# Patient Record
Sex: MALE | Race: WHITE | NOT HISPANIC OR LATINO | Employment: FULL TIME | URBAN - NONMETROPOLITAN AREA
[De-identification: names, ages, dates, MRNs, and addresses within clinical notes are randomized per-mention and may not be internally consistent; named-entity substitution may affect disease eponyms.]

---

## 2024-08-28 ENCOUNTER — HOSPITAL ENCOUNTER (EMERGENCY)
Facility: HOSPITAL | Age: 49
Discharge: HOME | End: 2024-08-28
Attending: STUDENT IN AN ORGANIZED HEALTH CARE EDUCATION/TRAINING PROGRAM

## 2024-08-28 ENCOUNTER — APPOINTMENT (OUTPATIENT)
Dept: RADIOLOGY | Facility: HOSPITAL | Age: 49
End: 2024-08-28

## 2024-08-28 ENCOUNTER — HOSPITAL ENCOUNTER (OUTPATIENT)
Dept: CARDIOLOGY | Facility: HOSPITAL | Age: 49
Discharge: HOME | End: 2024-08-28

## 2024-08-28 ENCOUNTER — APPOINTMENT (OUTPATIENT)
Dept: CARDIOLOGY | Facility: HOSPITAL | Age: 49
End: 2024-08-28

## 2024-08-28 VITALS
HEIGHT: 72 IN | WEIGHT: 230 LBS | SYSTOLIC BLOOD PRESSURE: 124 MMHG | HEART RATE: 66 BPM | BODY MASS INDEX: 31.15 KG/M2 | DIASTOLIC BLOOD PRESSURE: 84 MMHG | TEMPERATURE: 97.6 F | RESPIRATION RATE: 14 BRPM | OXYGEN SATURATION: 100 %

## 2024-08-28 DIAGNOSIS — R42 VERTIGO: Primary | ICD-10-CM

## 2024-08-28 DIAGNOSIS — R42 DIZZINESS: ICD-10-CM

## 2024-08-28 LAB
ALBUMIN SERPL BCP-MCNC: 4.5 G/DL (ref 3.4–5)
ALP SERPL-CCNC: 48 U/L (ref 33–120)
ALT SERPL W P-5'-P-CCNC: 42 U/L (ref 10–52)
AMMONIA PLAS-SCNC: 27 UMOL/L (ref 16–53)
ANION GAP SERPL CALC-SCNC: 14 MMOL/L (ref 10–20)
APPEARANCE UR: CLEAR
AST SERPL W P-5'-P-CCNC: 22 U/L (ref 9–39)
ATRIAL RATE: 65 BPM
ATRIAL RATE: 66 BPM
BASOPHILS # BLD AUTO: 0.06 X10*3/UL (ref 0–0.1)
BASOPHILS NFR BLD AUTO: 1.3 %
BILIRUB SERPL-MCNC: 0.7 MG/DL (ref 0–1.2)
BILIRUB UR STRIP.AUTO-MCNC: NEGATIVE MG/DL
BNP SERPL-MCNC: 23 PG/ML (ref 0–99)
BUN SERPL-MCNC: 15 MG/DL (ref 6–23)
CALCIUM SERPL-MCNC: 9.6 MG/DL (ref 8.6–10.3)
CARDIAC TROPONIN I PNL SERPL HS: <3 NG/L (ref 0–20)
CARDIAC TROPONIN I PNL SERPL HS: <3 NG/L (ref 0–20)
CHLORIDE SERPL-SCNC: 103 MMOL/L (ref 98–107)
CO2 SERPL-SCNC: 26 MMOL/L (ref 21–32)
COLOR UR: YELLOW
CREAT SERPL-MCNC: 0.93 MG/DL (ref 0.5–1.3)
EGFRCR SERPLBLD CKD-EPI 2021: >90 ML/MIN/1.73M*2
EOSINOPHIL # BLD AUTO: 0.09 X10*3/UL (ref 0–0.7)
EOSINOPHIL NFR BLD AUTO: 1.9 %
ERYTHROCYTE [DISTWIDTH] IN BLOOD BY AUTOMATED COUNT: 12.6 % (ref 11.5–14.5)
FLUAV RNA RESP QL NAA+PROBE: NOT DETECTED
FLUBV RNA RESP QL NAA+PROBE: NOT DETECTED
GLUCOSE BLD MANUAL STRIP-MCNC: 127 MG/DL (ref 74–99)
GLUCOSE SERPL-MCNC: 139 MG/DL (ref 74–99)
GLUCOSE UR STRIP.AUTO-MCNC: NORMAL MG/DL
HCT VFR BLD AUTO: 45 % (ref 41–52)
HGB BLD-MCNC: 15.6 G/DL (ref 13.5–17.5)
HOLD SPECIMEN: NORMAL
HOLD SPECIMEN: NORMAL
IMM GRANULOCYTES # BLD AUTO: 0.02 X10*3/UL (ref 0–0.7)
IMM GRANULOCYTES NFR BLD AUTO: 0.4 % (ref 0–0.9)
KETONES UR STRIP.AUTO-MCNC: NEGATIVE MG/DL
LACTATE SERPL-SCNC: 1.7 MMOL/L (ref 0.4–2)
LEUKOCYTE ESTERASE UR QL STRIP.AUTO: NEGATIVE
LIPASE SERPL-CCNC: 9 U/L (ref 9–82)
LYMPHOCYTES # BLD AUTO: 1.35 X10*3/UL (ref 1.2–4.8)
LYMPHOCYTES NFR BLD AUTO: 28.5 %
MAGNESIUM SERPL-MCNC: 1.89 MG/DL (ref 1.6–2.4)
MCH RBC QN AUTO: 29.6 PG (ref 26–34)
MCHC RBC AUTO-ENTMCNC: 34.7 G/DL (ref 32–36)
MCV RBC AUTO: 85 FL (ref 80–100)
MONOCYTES # BLD AUTO: 0.45 X10*3/UL (ref 0.1–1)
MONOCYTES NFR BLD AUTO: 9.5 %
MUCOUS THREADS #/AREA URNS AUTO: NORMAL /LPF
NEUTROPHILS # BLD AUTO: 2.77 X10*3/UL (ref 1.2–7.7)
NEUTROPHILS NFR BLD AUTO: 58.4 %
NITRITE UR QL STRIP.AUTO: NEGATIVE
NRBC BLD-RTO: 0 /100 WBCS (ref 0–0)
P AXIS: 43 DEGREES
P AXIS: 62 DEGREES
P OFFSET: 199 MS
P OFFSET: 205 MS
P ONSET: 144 MS
P ONSET: 146 MS
PH UR STRIP.AUTO: 6.5 [PH]
PHOSPHATE SERPL-MCNC: 2.6 MG/DL (ref 2.5–4.9)
PLATELET # BLD AUTO: 217 X10*3/UL (ref 150–450)
POTASSIUM SERPL-SCNC: 3.8 MMOL/L (ref 3.5–5.3)
PR INTERVAL: 148 MS
PR INTERVAL: 158 MS
PROT SERPL-MCNC: 7.3 G/DL (ref 6.4–8.2)
PROT UR STRIP.AUTO-MCNC: ABNORMAL MG/DL
Q ONSET: 220 MS
Q ONSET: 223 MS
QRS COUNT: 11 BEATS
QRS COUNT: 11 BEATS
QRS DURATION: 84 MS
QRS DURATION: 90 MS
QT INTERVAL: 418 MS
QT INTERVAL: 424 MS
QTC CALCULATION(BAZETT): 434 MS
QTC CALCULATION(BAZETT): 444 MS
QTC FREDERICIA: 429 MS
QTC FREDERICIA: 438 MS
R AXIS: 11 DEGREES
R AXIS: 34 DEGREES
RBC # BLD AUTO: 5.27 X10*6/UL (ref 4.5–5.9)
RBC # UR STRIP.AUTO: NEGATIVE /UL
RBC #/AREA URNS AUTO: NORMAL /HPF
SARS-COV-2 RNA RESP QL NAA+PROBE: NOT DETECTED
SODIUM SERPL-SCNC: 139 MMOL/L (ref 136–145)
SP GR UR STRIP.AUTO: 1.03
T AXIS: 23 DEGREES
T AXIS: 36 DEGREES
T OFFSET: 429 MS
T OFFSET: 435 MS
UROBILINOGEN UR STRIP.AUTO-MCNC: ABNORMAL MG/DL
VENTRICULAR RATE: 65 BPM
VENTRICULAR RATE: 66 BPM
WBC # BLD AUTO: 4.7 X10*3/UL (ref 4.4–11.3)
WBC #/AREA URNS AUTO: NORMAL /HPF

## 2024-08-28 PROCEDURE — 82140 ASSAY OF AMMONIA: CPT | Performed by: STUDENT IN AN ORGANIZED HEALTH CARE EDUCATION/TRAINING PROGRAM

## 2024-08-28 PROCEDURE — 83605 ASSAY OF LACTIC ACID: CPT | Performed by: STUDENT IN AN ORGANIZED HEALTH CARE EDUCATION/TRAINING PROGRAM

## 2024-08-28 PROCEDURE — 81001 URINALYSIS AUTO W/SCOPE: CPT | Performed by: STUDENT IN AN ORGANIZED HEALTH CARE EDUCATION/TRAINING PROGRAM

## 2024-08-28 PROCEDURE — 83735 ASSAY OF MAGNESIUM: CPT | Performed by: STUDENT IN AN ORGANIZED HEALTH CARE EDUCATION/TRAINING PROGRAM

## 2024-08-28 PROCEDURE — 83880 ASSAY OF NATRIURETIC PEPTIDE: CPT | Performed by: STUDENT IN AN ORGANIZED HEALTH CARE EDUCATION/TRAINING PROGRAM

## 2024-08-28 PROCEDURE — 93005 ELECTROCARDIOGRAM TRACING: CPT

## 2024-08-28 PROCEDURE — 82947 ASSAY GLUCOSE BLOOD QUANT: CPT

## 2024-08-28 PROCEDURE — 84484 ASSAY OF TROPONIN QUANT: CPT | Performed by: STUDENT IN AN ORGANIZED HEALTH CARE EDUCATION/TRAINING PROGRAM

## 2024-08-28 PROCEDURE — 70450 CT HEAD/BRAIN W/O DYE: CPT | Performed by: RADIOLOGY

## 2024-08-28 PROCEDURE — 2500000001 HC RX 250 WO HCPCS SELF ADMINISTERED DRUGS (ALT 637 FOR MEDICARE OP): Performed by: STUDENT IN AN ORGANIZED HEALTH CARE EDUCATION/TRAINING PROGRAM

## 2024-08-28 PROCEDURE — 83690 ASSAY OF LIPASE: CPT | Performed by: STUDENT IN AN ORGANIZED HEALTH CARE EDUCATION/TRAINING PROGRAM

## 2024-08-28 PROCEDURE — 87636 SARSCOV2 & INF A&B AMP PRB: CPT | Performed by: STUDENT IN AN ORGANIZED HEALTH CARE EDUCATION/TRAINING PROGRAM

## 2024-08-28 PROCEDURE — 2500000004 HC RX 250 GENERAL PHARMACY W/ HCPCS (ALT 636 FOR OP/ED): Performed by: STUDENT IN AN ORGANIZED HEALTH CARE EDUCATION/TRAINING PROGRAM

## 2024-08-28 PROCEDURE — 99285 EMERGENCY DEPT VISIT HI MDM: CPT | Mod: 25

## 2024-08-28 PROCEDURE — 70450 CT HEAD/BRAIN W/O DYE: CPT

## 2024-08-28 PROCEDURE — 36415 COLL VENOUS BLD VENIPUNCTURE: CPT | Performed by: STUDENT IN AN ORGANIZED HEALTH CARE EDUCATION/TRAINING PROGRAM

## 2024-08-28 PROCEDURE — 80053 COMPREHEN METABOLIC PANEL: CPT | Performed by: STUDENT IN AN ORGANIZED HEALTH CARE EDUCATION/TRAINING PROGRAM

## 2024-08-28 PROCEDURE — 96360 HYDRATION IV INFUSION INIT: CPT

## 2024-08-28 PROCEDURE — 84100 ASSAY OF PHOSPHORUS: CPT | Performed by: STUDENT IN AN ORGANIZED HEALTH CARE EDUCATION/TRAINING PROGRAM

## 2024-08-28 PROCEDURE — 85025 COMPLETE CBC W/AUTO DIFF WBC: CPT | Performed by: STUDENT IN AN ORGANIZED HEALTH CARE EDUCATION/TRAINING PROGRAM

## 2024-08-28 RX ORDER — MECLIZINE HCL 12.5 MG 12.5 MG/1
25 TABLET ORAL ONCE
Status: COMPLETED | OUTPATIENT
Start: 2024-08-28 | End: 2024-08-28

## 2024-08-28 RX ORDER — ACETAMINOPHEN 325 MG/1
975 TABLET ORAL ONCE
Status: COMPLETED | OUTPATIENT
Start: 2024-08-28 | End: 2024-08-28

## 2024-08-28 RX ORDER — MECLIZINE HYDROCHLORIDE 25 MG/1
25 TABLET ORAL 2 TIMES DAILY
Qty: 20 TABLET | Refills: 0 | Status: SHIPPED | OUTPATIENT
Start: 2024-08-28 | End: 2024-09-07

## 2024-08-28 ASSESSMENT — PAIN - FUNCTIONAL ASSESSMENT: PAIN_FUNCTIONAL_ASSESSMENT: 0-10

## 2024-08-28 ASSESSMENT — COLUMBIA-SUICIDE SEVERITY RATING SCALE - C-SSRS
2. HAVE YOU ACTUALLY HAD ANY THOUGHTS OF KILLING YOURSELF?: NO
6. HAVE YOU EVER DONE ANYTHING, STARTED TO DO ANYTHING, OR PREPARED TO DO ANYTHING TO END YOUR LIFE?: NO
1. IN THE PAST MONTH, HAVE YOU WISHED YOU WERE DEAD OR WISHED YOU COULD GO TO SLEEP AND NOT WAKE UP?: NO

## 2024-08-28 ASSESSMENT — PAIN SCALES - GENERAL: PAINLEVEL_OUTOF10: 0 - NO PAIN

## 2024-08-28 NOTE — ED TRIAGE NOTES
Pt states having dizziness and diaphoretic starting suddenly about an hour ago, as well as 1 episode of vomiting when the episode started. Pt states this also happened to him about a year ago, they found nothing wrong.

## 2024-08-28 NOTE — ED PROVIDER NOTES
"Chief Complaint: Dizziness  HPI: This is a 49-year-old male, presenting to the emergency department for evaluation of dizziness which began this morning.  Patient states that the dizziness is worse with head movement, change in position.  He states that he feels \"intoxicated\".  He states he has had this in the past, however never had a diagnosis for it.  He denies any associated chest pain, shortness of breath, weakness, numbness, tingling.  He does complain of a headache.    History reviewed. No pertinent past medical history.   History reviewed. No pertinent surgical history.    Physical Exam  Constitutional:       Appearance: Normal appearance.   HENT:      Head: Normocephalic and atraumatic.      Mouth/Throat:      Mouth: Mucous membranes are moist.   Eyes:      Extraocular Movements: Extraocular movements intact.      Pupils: Pupils are equal, round, and reactive to light.   Cardiovascular:      Rate and Rhythm: Normal rate and regular rhythm.      Pulses: Normal pulses.   Pulmonary:      Effort: Pulmonary effort is normal.   Abdominal:      General: Abdomen is flat.      Palpations: Abdomen is soft.      Tenderness: There is no abdominal tenderness.   Musculoskeletal:      Cervical back: Normal range of motion and neck supple.   Skin:     General: Skin is warm.   Neurological:      General: No focal deficit present.      Mental Status: He is alert.      Cranial Nerves: Cranial nerves 2-12 are intact.      Sensory: Sensation is intact.      Motor: Motor function is intact. No pronator drift.      Coordination: Coordination is intact. Romberg sign negative.      Gait: Gait is intact.   Psychiatric:         Mood and Affect: Mood normal.        ED Course/MDM  Diagnoses as of 08/28/24 1404   Vertigo   Dizziness     EKG interpreted by myself (ED attending physician): Normal sinus rhythm, rate of 66, normal axis, normal intervals, no ST segment changes, nonischemic EKG    This is a 49 y.o. male presenting to the ED " for evaluation of dizziness which began today.  Patient states that it feels like the room is spinning and that he is intoxicated.  He denies drinking in the last several years.  He also complains of a headache, however states that this is not the worst headache of his life.  On physical exam, the patient is resting comfortably in the bed, no acute distress.  Heart is regular rhythm, lungs are clear to auscultation bilaterally.  Neuroexam is nonfocal including a normal finger-nose-finger, normal heel shin, negative Romberg, and normal ambulation.  No pronator drift.  No facial droop.  Lab work was obtained and is overall grossly unremarkable.  CT head was also obtained and is not concerning for any acute intracranial pathology.  Posterior stroke was considered, however felt less likely as the patient's neuroexam is nonfocal and his dizziness resolved after meclizine.  I do feel that his symptoms are likely related to vertigo, and he is safe and stable for outpatient follow-up.  He was advised to return to the emergency department for any worsening symptoms and was ultimately discharged home in stable condition.    Final Impression  1.  Vertigo  Disposition/Plan: Discharge home  Condition at disposition: Stable.     Ana De Souza DO  Emergency Medicine Physician     Ana De Souza,   08/28/24 1329

## 2024-10-14 ENCOUNTER — APPOINTMENT (OUTPATIENT)
Dept: OTOLARYNGOLOGY | Facility: CLINIC | Age: 49
End: 2024-10-14

## 2024-10-14 DIAGNOSIS — R42 DIZZINESS: Primary | ICD-10-CM

## 2024-10-14 PROCEDURE — 99204 OFFICE O/P NEW MOD 45 MIN: CPT | Performed by: OTOLARYNGOLOGY

## 2024-10-14 PROCEDURE — 1036F TOBACCO NON-USER: CPT | Performed by: OTOLARYNGOLOGY

## 2024-10-14 NOTE — PROGRESS NOTES
"History Of Present Illness  Moe Linares is a 49 y.o. male presenting with: \"Was in the ER in September was treated for vertigo\". He was in ER on 08.28.2024 for dizziness, which lasted for hours. CT head was normal. He had similar attacks in the past. Could be at least once a year. He vomited couple of times.     Tinnitus (+), off and on  He has ear fullness (+) both,   Decreased or fluctuating hearing (-)  History of ear tubes in childhood.    On examination, there is a small, round protuberance at left ear canal close to the entrance of ear canal around 9 o'clock. TMs look intact bilaterally. Nasal septum deviated to left. No visible postnasal discharge. Mild right postauricular tenderness. No palpable neck mass.  Brown-Hallpike maneuver has shown mild dizziness at left without nystagmus.  Left Epley maneuver was performed.    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    Plan  1-audio vestibular tests  2-follow-up after the tests      Past Medical History  He has no past medical history on file.    Surgical History  He has no past surgical history on file.     Social History  He reports that he has never smoked. He has never used smokeless tobacco. He reports that he does not drink alcohol and does not use drugs.    Family History  No family history on file.     Allergies  Patient has no known allergies.    Review of Systems   Feeling tired  Eyes itch  Ear pain  Vertigo  Dizziness upon standing  Cough  Coughing up sputum  Heartburn  Nausea  Vomiting     Physical Exam    General appearance: Healthy-appearing, well-nourished, well groomed, in no acute distress.     Head and Face: Atraumatic with no masses, lesions, or scarring.      Salivary glands: No tenderness of the parotid glands or parotid masses.     No tenderness of the submandibular glands or submandibular masses.      Facial strength: Normal strength and symmetry, no synkinesis or " "facial tic.     Eyes: Conjunctivas look non-hyperemic bilaterally    Ears:Small round mass in left ear canal around 9 o'clock, looks benign. Tympanic membranes look intact, no hyperemia, fluid or retraction.      Nose: Mucosa looks normal. No purulent discharge. Septum deviated to left.     Oral Cavity/Mouth: Lips and tongue look normal.     Throat: No postnasal discharge. No tonsil hypertrophy. No hyperemia.    Neck: Symmetrical, trachea midline.     Pulmonary: Normal respiratory effort.     Lymphatic: No palpable pathologic lymph nodes at neck.     Neurological/Psychiatric Orientation to person, place, and time: Normal.     Mood and affect: Normal.      Extremities: No clubbing.     Skin: No significant skin lesions were noted at face or neck        Procedure         Last Recorded Vitals  There were no vitals taken for this visit.    Relevant Results    Assessment and Plan:  Moe Linares is a 49 y.o. male presenting with: \"Was in the ER in September was treated for vertigo\". He was in ER on 08.28.2024 for dizziness, which lasted for hours. CT head was normal. He had similar attacks in the past. Could be at least once a year. He vomited couple of times.     Tinnitus (+), off and on  He has ear fullness (+) both,   Decreased or fluctuating hearing (-)  History of ear tubes in childhood.    On examination, there is a small, round protuberance at left ear canal close to the entrance of ear canal around 9 o'clock. TMs look intact bilaterally. Nasal septum deviated to left. No visible postnasal discharge. Mild right postauricular tenderness. No palpable neck mass.  Brown-Hallpike maneuver has shown mild dizziness at left without nystagmus.  Left Epley maneuver was performed.    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    Plan  1-audio vestibular tests  2-follow-up after the tests    [facility]  Otolaryngology - Head & Neck Surgery  "

## 2024-10-14 NOTE — LETTER
"October 14, 2024     Primo Dodson DO  [address] [facility] [address]    Patient: Moe Linares   YOB: 1975   Date of Visit: 10/14/2024       Dear Dr. Primo Dodson DO:    Thank you for referring Moe Linares to me for evaluation. Below are my notes for this consultation.  If you have questions, please do not hesitate to call me. I look forward to following your patient along with you.       Sincerely,     Eliezer Snow MD      CC: No Recipients  ______________________________________________________________________________________    History Of Present Illness  Moe Linares is a 49 y.o. male presenting with: \"Was in the ER in September was treated for vertigo\". He was in ER on 08.28.2024 for dizziness, which lasted for hours. CT head was normal. He had similar attacks in the past. Could be at least once a year. He vomited couple of times.     Tinnitus (+), off and on  He has ear fullness (+) both,   Decreased or fluctuating hearing (-)  History of ear tubes in childhood.    On examination, there is a small, round protuberance at left ear canal close to the entrance of ear canal around 9 o'clock. TMs look intact bilaterally. Nasal septum deviated to left. No visible postnasal discharge. Mild right postauricular tenderness. No palpable neck mass.  Gregory-Hallpike maneuver has shown mild dizziness at left without nystagmus.  Left Epley maneuver was performed.    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    Plan  1-audio vestibular tests  2-follow-up after the tests      Past Medical History  He has no past medical history on file.    Surgical History  He has no past surgical history on file.     Social History  He reports that he has never smoked. He has never used smokeless tobacco. He reports that he does not drink alcohol and does not use drugs.    Family History  No family history on file.     Allergies  Patient has no known " "allergies.    Review of Systems   Feeling tired  Eyes itch  Ear pain  Vertigo  Dizziness upon standing  Cough  Coughing up sputum  Heartburn  Nausea  Vomiting     Physical Exam    General appearance: Healthy-appearing, well-nourished, well groomed, in no acute distress.     Head and Face: Atraumatic with no masses, lesions, or scarring.      Salivary glands: No tenderness of the parotid glands or parotid masses.     No tenderness of the submandibular glands or submandibular masses.      Facial strength: Normal strength and symmetry, no synkinesis or facial tic.     Eyes: Conjunctivas look non-hyperemic bilaterally    Ears:Small round mass in left ear canal around 9 o'clock, looks benign. Tympanic membranes look intact, no hyperemia, fluid or retraction.      Nose: Mucosa looks normal. No purulent discharge. Septum deviated to left.     Oral Cavity/Mouth: Lips and tongue look normal.     Throat: No postnasal discharge. No tonsil hypertrophy. No hyperemia.    Neck: Symmetrical, trachea midline.     Pulmonary: Normal respiratory effort.     Lymphatic: No palpable pathologic lymph nodes at neck.     Neurological/Psychiatric Orientation to person, place, and time: Normal.     Mood and affect: Normal.      Extremities: No clubbing.     Skin: No significant skin lesions were noted at face or neck        Procedure         Last Recorded Vitals  There were no vitals taken for this visit.    Relevant Results    Assessment and Plan:  Moe Linares is a 49 y.o. male presenting with: \"Was in the ER in September was treated for vertigo\". He was in ER on 08.28.2024 for dizziness, which lasted for hours. CT head was normal. He had similar attacks in the past. Could be at least once a year. He vomited couple of times.     Tinnitus (+), off and on  He has ear fullness (+) both,   Decreased or fluctuating hearing (-)  History of ear tubes in childhood.    On examination, there is a small, round protuberance at left ear canal close to " the entrance of ear canal around 9 o'clock. TMs look intact bilaterally. Nasal septum deviated to left. No visible postnasal discharge. Mild right postauricular tenderness. No palpable neck mass.  Brown-Hallpike maneuver has shown mild dizziness at left without nystagmus.  Left Epley maneuver was performed.    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    Plan  1-audio vestibular tests  2-follow-up after the tests    Eliezer Snow  Otolaryngology - Head & Neck Surgery

## 2024-10-17 ENCOUNTER — APPOINTMENT (OUTPATIENT)
Dept: AUDIOLOGY | Facility: CLINIC | Age: 49
End: 2024-10-17
Payer: COMMERCIAL

## 2024-11-21 ENCOUNTER — APPOINTMENT (OUTPATIENT)
Dept: AUDIOLOGY | Facility: CLINIC | Age: 49
End: 2024-11-21
Payer: COMMERCIAL

## 2024-11-21 DIAGNOSIS — R42 DIZZINESS: ICD-10-CM

## 2024-11-21 DIAGNOSIS — H90.41 SENSORINEURAL HEARING LOSS (SNHL) OF RIGHT EAR WITH UNRESTRICTED HEARING OF LEFT EAR: Primary | ICD-10-CM

## 2024-11-21 PROCEDURE — 92550 TYMPANOMETRY & REFLEX THRESH: CPT | Performed by: AUDIOLOGIST

## 2024-11-21 PROCEDURE — 92537 CALORIC VSTBLR TEST W/REC: CPT | Performed by: AUDIOLOGIST

## 2024-11-21 PROCEDURE — 92557 COMPREHENSIVE HEARING TEST: CPT | Performed by: AUDIOLOGIST

## 2024-11-21 ASSESSMENT — PAIN - FUNCTIONAL ASSESSMENT: PAIN_FUNCTIONAL_ASSESSMENT: 0-10

## 2024-11-21 ASSESSMENT — ENCOUNTER SYMPTOMS: OCCASIONAL FEELINGS OF UNSTEADINESS: 1

## 2024-11-21 ASSESSMENT — PAIN SCALES - GENERAL: PAINLEVEL_OUTOF10: 0 - NO PAIN

## 2024-11-21 NOTE — PROGRESS NOTES
"  AUDIOLOGY  ELECTROCOCHLEOGRAPHY (ECOG / ECochG) EVALUATION  VIDEO HEAD IMPULSE TEST (vHIT)  VIDEONYSTAGMOGRAPHY (VNG) EVALUATION    Name:  Moe Linares  :  1975  Age:  49 y.o.  Date of Evaluation:  2024    Reason for visit: Mr. Linares is seen in the clinic today at the request of Eliezer Snow MD in otolaryngology for a electrocochleography (ECOG / ECochG) evaluation, video head impulse test (vHIT), and videonystagmography (VNG) evaluation.    HISTORY  Patient reports a history of random, episodic dizziness described as \"fuzzy\" vision with lightheadedness, vertigo (spinning sensation), imbalance, and nausea/vomiting. Symptoms began about two years ago, and he has had three episodes in total while feeling completely normal in between. Episodes last several hours before subsiding. Most recent episode occurred in summer 2024. Symptoms are alleviated by sleeping. Overall the severity of the patient's episodes have been stable over time. He has not fallen due to his symptoms. Denied any symptoms provoked by sneezing or coughing, as well as any presence of autophony. Relevant medical history includes a history of concussion, and a remote history of migraine.    Patient reports difficulty hearing especially from his right ear, periodic tinnitus in both ears, and periodic aural fullness/pressure in both ears. He is unsure if his hearing, tinnitus or aural fullness/pressure change during episodes of dizziness. 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 History of myringotomy with tubes in childhood in both ears. Patient denies otalgia, otorrhea, and a family history of hearing loss from a young age.     Most recent audiologic evaluation performed on 2024 revealed low frequency sensorineural hearing loss in the right ear, and normal " "hearing sensitivity in the left ear. Word recognition ability was excellent bilaterally. Tympanometry revealed normal tympanic membrane mobility with normal middle ear pressure bilaterally. No prior vestibular evaluation.    Patient reported compliance with all pre-test instructions.     Case history obtained via patient-clinician interview and patient chart review.    SCREENINGS  Steadi Fall Risk  One or more falls in the last year? No  Feels unsteady when walking? Yes  Worried about Falling? No    Domestic Violence Screening  Are you currently or have you recently been threatened or abused physically, emotionally, or sexually by anyone? No  Do you feel UNSAFE going back to the place you are living? No    Pain Assessment  Pain Assessment: 0-10  0-10 (Numeric) Pain Score: 0 - No pain    EVALUATION  See scanned results under “Media”  \"Visit Notes”  Document ID [id number].    RESULTS  Screening Auditory Brainstem Response (ABR) Test  ABR testing was presented to each ear at 95 dB nHL using a click stimuli at a rate of 27.7. Replicable Wave I, III and V traces were obtained bilaterally, and the latencies were compared (below). Impedances were consistently between 2-5 kOhms throughout testing. Waveform validity was verified with non-acoustic runs.     Right Ear Latencies:  Wave I: 1.30 ms  Wave III: 3.90 ms  Wave V: 5.83 ms     Left Ear Latencies:  Wave I: 1.30 ms  Wave III: 3.77 ms  Wave V: 5.83 ms     Wave V Interpeak Latency: 0.00 ms     Interaural Wave V latencies on screening ABR were within normal limits.       Electrocochleography (ECOG / ECochG) Evaluation  ECOG testing was presented to each ear initially at 95 dB nHL using a click stimuli at a rate of 8.1. Replicable waveforms were obtained bilaterally. The summating potential/action potential ratio (SP/AP) were compared (below). Impedances were consistently between 2-10 kOhms throughout testing.     Right Ear SP/AP Ratios at 100 dB nHL: 0.563 & 0.541  Left " Ear SP/AP Ratios at 95 dB nHL: 0.290 & 0.389     The SP/AP Ratios were consistent with abnormal cochlear potential in the right ear.  The SP/AP Ratios were consistent with normal cochlear potential in the left ear.    Video Head Impulse Test (vHIT)  The vHIT procedure provides objective assessment of the high frequency vestibulo-ocular reflex (VOR) for each semicircular canal. Rapid, random horizontal and vertical thrusts were applied to the patient's head to provoke the VOR.    Right Ear 60 ms Gain Values:   Lateral: 1.02   Anterior: 0.92   Posterior: 1.29  Left Ear 60 ms Gain Values:   Lateral: 1.19   Anterior: 1.25   Posterior: 0.72    Appropriate Gain Values. No evidence of overt or covert saccades.  Please note, significant goggle slippage and noise from eye blinks made interpretation difficult.    Videonystagmography (VNG) Evaluation  VNG provides objective indications of peripheral and central vestibulo-ocular pathway involvement. Ocular motor testing to visually guided targets was conducted using a dual channel video-recording technique for the recording of eye movement in the horizontal and vertical planes. Bithermal air caloric testing was performed at 48 degrees Celsius and 24 degrees Celsius.     Otoscopy: Normal. Minimal non-occluding cerumen with visualization of the tympanic membrane  bilaterally.  Calibration: Normal. Adequate.     Random Saccades: Normal. Latencies, velocities, and accuracies are within normal limits.  Gaze: Normal. Normal gaze to left, right, up, down, and center targets.  Smooth Pursuit: Normal. Gain and symmetry are within normal limits.  Optokinetic (OPKs): Normal. Gain and symmetry are within normal limits.  Spontaneous Nystagmus: Normal. No spontaneous nystagmus observed/recorded in sitting or supine positions.     Brown-Hallpike: Normal. No observed/recorded nystagmus during head right or head left maneuver.  Positional: Normal. No observed/recorded nystagmus in sitting,  supine, head right, or head left positions.     Bithermal Caloric: Well-formed nystagmus was recorded with each irrigation.  Right warm: -17 degrees per second  Left warm: 22 degrees per second  Right cool: 11 degrees per second  Left cool: -14 degrees per second  Unilateral weakness: Normal. 12% weaker in the right ear is not significant.  Directional preponderance: Normal. 3% left beating stronger is not significant.  Fixation suppression: Normal. No failure to fixate.     VNG provides no evidence of peripheral or central vestibular system disorder.    IMPRESSIONS  Electrocochleography (ECOG) test is suggestive of endolymphatic hydrops (or Meniere's disease) in the right ear; normal in the left ear. A screening auditory brainstem response (ABR) was within normal limits bilaterally, and was negative for any retrocochlear pathologies. Normal video head impulse test (vHIT) and videonystagmography (VNG) evaluation. Raw data reviewed by a member of the Vestibular & Balance Disorders team.      RECOMMENDATIONS  - Follow up with Eliezer Snow MD in otolaryngology.  - Follow-up with medical care team as planned.     PATIENT EDUCATION  Discussed results, impressions and recommendations with the patient. Questions were addressed and the patient was encouraged to contact our office should concerns arise.     Time for this encounter: 4630-6952     Judith Victor, CCC-A  Licensed Audiologist

## 2024-11-21 NOTE — PROGRESS NOTES
AUDIOLOGY    Name:  Moe Linares  :  1975   Age:  49 y.o.  Date:  2024    Patient's electrocochleography (ECOG) evaluation performed in conjunction with video head impulse test (vHIT) and videonystagmography (VNG) evaluation. Please refer to same day provider note for further details.     Judith Victor, CCC-A  Licensed Audiologist

## 2024-11-21 NOTE — PROGRESS NOTES
"  AUDIOLOGY  AUDIOMETRIC EVALUATION    Name:  Moe Linares  :  1975  Age:  49 y.o.  Date of Evaluation:  2024    Reason for visit: Mr. Linares is seen in the clinic today at the request of Eliezer Snow MD in otolaryngology for an audiologic evaluation.     HISTORY  Patient reports a history of random, episodic dizziness described as \"fuzzy\" vision with lightheadedness, vertigo (spinning sensation), imbalance, and nausea/vomiting. Symptoms began about two years ago, and he has had three episodes in total while feeling completely normal in between. Episodes last several hours before subsiding. Most recent episode occurred in summer 2024. Symptoms are alleviated by sleeping. Overall the severity of the patient's episodes have been stable over time. He has not fallen due to his symptoms. Denied any symptoms provoked by sneezing or coughing, as well as any presence of autophony. Relevant medical history includes a history of concussion, and a remote history of migraine.    Patient reports difficulty hearing especially from his right ear, periodic tinnitus in both ears, and periodic aural fullness/pressure in both ears. He is unsure if his hearing, tinnitus or aural fullness/pressure change during episodes of dizziness. 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 History of myringotomy with tubes in childhood in both ears. Patient denies otalgia, otorrhea, and a family history of hearing loss from a young age.     No prior audiologic evaluation is available for comparison.     Case history obtained via patient-clinician interview and patient chart review.    SCREENINGS  Steadi Fall Risk  One or more falls in the last year? No  Feels unsteady when walking? Yes  Worried about Falling? No    Domestic Violence Screening  Are you currently or " have you recently been threatened or abused physically, emotionally, or sexually by anyone? No  Do you feel UNSAFE going back to the place you are living? No    Pain Assessment  Pain Assessment: 0-10  0-10 (Numeric) Pain Score: 0 - No pain    EVALUATION  See scanned audiogram: “Media” > “Audiology Report” > Document ID [id number].      RESULTS  Otoscopic Evaluation  Right Ear: minimal non-occluding cerumen with visualization of the tympanic membrane  Left Ear: minimal non-occluding cerumen with visualization of the tympanic membrane    Immittance Measures  Tympanometry:  Right Ear: Type A, normal tympanic membrane mobility with normal middle ear pressure  Left Ear: Type A, normal tympanic membrane mobility with normal middle ear pressure    Acoustic Reflexes:  Ipsilateral Right Ear: present and normal from 500 Hz to 4000 Hz  Ipsilateral Left Ear: present and normal from 500 Hz to 4000 Hz  Contralateral Right Ear: did not evaluate  Contralateral Left Ear: did not evaluate    Distortion Product Otoacoustic Emissions (DPOAEs)  Right Ear: present from 1500 Hz to 8000 Hz, absent at 1000 Hz  Left Ear: present from 1500 Hz to 6000 Hz, absent at 1000 Hz and at 8000 Hz    Audiometry  Test Technique and Reliability:   Standard audiometry via supra-aural headphones. Pulsed tone stimulus. Reliability is good.    Pure tone air and bone conduction audiometry:  Right Ear: mild sensorineural hearing loss through 750 Hz rising to normal hearing sensitivity in the higher frequencies   Left Ear: normal hearing sensitivity    Speech Audiometry:  Speech Reception Threshold (SRT) Right Ear: 20 dB HL  Speech Reception Threshold (SRT) Left Ear: 15 dB HL  Word Recognition Score (WRS) Right Ear: Excellent, 100% at 60 dB HL  Word Recognition Score (WRS) Left Ear: Excellent, 96% at 55 dB HL     IMPRESSIONS  Audiometric evaluation revealed low frequency sensorineural hearing loss rising to normal hearing sensitivity in the higher frequencies in  the right ear, and normal hearing sensitivity in the left ear. Word recognition ability was excellent bilaterally. Tympanometry indicates normal tympanic membrane mobility with normal middle ear pressure (Type A) bilaterally. The presence of acoustic reflexes within normal intensity limits is consistent with normal middle ear and brainstem function, and suggests that auditory sensitivity is not significantly impaired. Present Distortion Product Otoacoustic Emissions (DPOAEs) suggest normal/near normal cochlear outer hair cell function and are consistent with no greater than a mild hearing loss at those frequencies. No prior audiologic evaluation is available for comparison.     RECOMMENDATIONS  - Electrocochleography (ECOG / ECochG) evaluation, video head impulse test (vHIT), and videonystagmography (VNG) evaluation today as scheduled.    PATIENT EDUCATION  Discussed results, impressions and recommendations with the patient. Questions were addressed and the patient was encouraged to contact our office should concerns arise.    Time for this encounter: 830-900    Judith Victor, CCC-A  Licensed Audiologist

## 2024-12-31 ENCOUNTER — OFFICE VISIT (OUTPATIENT)
Dept: URGENT CARE | Age: 49
End: 2024-12-31
Payer: COMMERCIAL

## 2024-12-31 VITALS
OXYGEN SATURATION: 95 % | WEIGHT: 235 LBS | BODY MASS INDEX: 31.83 KG/M2 | HEART RATE: 82 BPM | HEIGHT: 72 IN | SYSTOLIC BLOOD PRESSURE: 133 MMHG | RESPIRATION RATE: 12 BRPM | DIASTOLIC BLOOD PRESSURE: 96 MMHG | TEMPERATURE: 97.9 F

## 2024-12-31 DIAGNOSIS — R05.9 COUGH, UNSPECIFIED TYPE: ICD-10-CM

## 2024-12-31 DIAGNOSIS — J40 BRONCHITIS: Primary | ICD-10-CM

## 2024-12-31 DIAGNOSIS — R06.2 WHEEZE: ICD-10-CM

## 2024-12-31 LAB
POC RAPID INFLUENZA A: NEGATIVE
POC RAPID INFLUENZA B: NEGATIVE

## 2024-12-31 PROCEDURE — 87804 INFLUENZA ASSAY W/OPTIC: CPT | Performed by: NURSE PRACTITIONER

## 2024-12-31 PROCEDURE — 3008F BODY MASS INDEX DOCD: CPT | Performed by: NURSE PRACTITIONER

## 2024-12-31 PROCEDURE — 99203 OFFICE O/P NEW LOW 30 MIN: CPT | Performed by: NURSE PRACTITIONER

## 2024-12-31 RX ORDER — DOXYCYCLINE 100 MG/1
100 CAPSULE ORAL 2 TIMES DAILY
Qty: 20 CAPSULE | Refills: 0 | Status: SHIPPED | OUTPATIENT
Start: 2024-12-31 | End: 2025-01-10

## 2024-12-31 RX ORDER — METHYLPREDNISOLONE 4 MG/1
TABLET ORAL
Qty: 21 TABLET | Refills: 0 | Status: SHIPPED | OUTPATIENT
Start: 2024-12-31 | End: 2025-01-06

## 2024-12-31 RX ORDER — ALBUTEROL SULFATE 90 UG/1
2 INHALANT RESPIRATORY (INHALATION) EVERY 4 HOURS PRN
Qty: 6.7 G | Refills: 0 | Status: SHIPPED | OUTPATIENT
Start: 2024-12-31 | End: 2025-12-31

## 2024-12-31 ASSESSMENT — ENCOUNTER SYMPTOMS
RHINORRHEA: 1
ENDOCRINE NEGATIVE: 1
SHORTNESS OF BREATH: 1
MUSCULOSKELETAL NEGATIVE: 1
HEMATOLOGIC/LYMPHATIC NEGATIVE: 1
SORE THROAT: 1
CARDIOVASCULAR NEGATIVE: 1
SINUS PAIN: 1
FATIGUE: 1
COUGH: 1
EYES NEGATIVE: 1
HEADACHES: 1
ALLERGIC/IMMUNOLOGIC NEGATIVE: 1
PSYCHIATRIC NEGATIVE: 1
SINUS PRESSURE: 1

## 2024-12-31 NOTE — PROGRESS NOTES
Subjective   Patient ID: Moe Linares is a 49 y.o. male. They present today with a chief complaint of Cough (Symptoms since sunday), Other (Chest congestion, hx of bronchitis. Chills), and Headache.    History of Present Illness    History provided by:  Patient   used: No    Cough  This is a new problem. The current episode started in the past 7 days. The problem has been gradually worsening. The problem occurs constantly. The cough is Productive of brown sputum. Associated symptoms include ear pain, headaches, nasal congestion, rhinorrhea, a sore throat and shortness of breath. The symptoms are aggravated by lying down. He has tried OTC cough suppressant for the symptoms. His past medical history is significant for bronchitis.   Headache  Progression:  Worsening  Associated symptoms: cough, ear pain, fatigue, sinus pressure and sore throat        Past Medical History  Allergies as of 12/31/2024    (No Known Allergies)       (Not in a hospital admission)       No past medical history on file.    No past surgical history on file.     reports that he has never smoked. He has never used smokeless tobacco. He reports that he does not drink alcohol and does not use drugs.    Review of Systems  Review of Systems   Constitutional:  Positive for fatigue.   HENT:  Positive for ear pain, rhinorrhea, sinus pressure, sinus pain and sore throat.    Eyes: Negative.    Respiratory:  Positive for cough and shortness of breath.    Cardiovascular: Negative.    Endocrine: Negative.    Genitourinary: Negative.    Musculoskeletal: Negative.    Skin: Negative.    Allergic/Immunologic: Negative.    Neurological:  Positive for headaches.   Hematological: Negative.    Psychiatric/Behavioral: Negative.                                    Objective    Vitals:    12/31/24 1150   BP: (!) 133/96   BP Location: Left arm   Patient Position: Sitting   BP Cuff Size: Large adult   Pulse: 82   Resp: 12   Temp: 36.6 °C (97.9 °F)    TempSrc: Oral   SpO2: 95%   Weight: 107 kg (235 lb)   Height: 1.829 m (6')     No LMP for male patient.    Physical Exam  Vitals and nursing note reviewed.   Constitutional:       Appearance: Normal appearance. He is normal weight.   HENT:      Nose: Congestion and rhinorrhea present.   Cardiovascular:      Rate and Rhythm: Normal rate and regular rhythm.      Pulses: Normal pulses.      Heart sounds: Normal heart sounds.   Pulmonary:      Breath sounds: Examination of the right-middle field reveals wheezing. Examination of the left-middle field reveals wheezing. Examination of the right-lower field reveals wheezing. Examination of the left-lower field reveals wheezing. Wheezing present.   Abdominal:      General: Bowel sounds are normal.      Palpations: Abdomen is soft.   Skin:     General: Skin is warm.   Neurological:      General: No focal deficit present.      Mental Status: He is alert and oriented to person, place, and time. Mental status is at baseline.   Psychiatric:         Mood and Affect: Mood normal.         Behavior: Behavior normal.         Thought Content: Thought content normal.         Judgment: Judgment normal.         Procedures    Point of Care Test & Imaging Results from this visit  Results for orders placed or performed in visit on 12/31/24   POCT Influenza A/B manually resulted   Result Value Ref Range    POC Rapid Influenza A Negative Negative    POC Rapid Influenza B Negative Negative      No results found.    Diagnostic study results (if any) were reviewed by MICHEAL Lemus.    Assessment/Plan   Allergies, medications, history, and pertinent labs/EKGs/Imaging reviewed by MICHEAL Lemus.     Medical Decision Making  Medical Decision Making  At time of discharge patient was clinically well-appearing and HDS for outpatient management. The patient and/or family was educated regarding diagnosis, supportive care, OTC and Rx medications. The patient and/or family was  given the opportunity to ask questions prior to discharge.  They verbalized understanding of my discussion of the plans for treatment, expected course, indications to return to UC or seek further evaluation in ED, and the need for timely follow up as directed.   They were provided with a work/school excuse if requested.        Orders and Diagnoses  Diagnoses and all orders for this visit:  Bronchitis  -     doxycycline (Vibramycin) 100 mg capsule; Take 1 capsule (100 mg) by mouth 2 times a day for 10 days. Take with at least 8 ounces (large glass) of water, do not lie down for 30 minutes after  -     methylPREDNISolone (Medrol Dospak) 4 mg tablets; Follow schedule on package instructions  -     albuterol (Proventil HFA) 90 mcg/actuation inhaler; Inhale 2 puffs every 4 hours if needed for wheezing or shortness of breath.  Cough, unspecified type  -     POCT Influenza A/B manually resulted  -     doxycycline (Vibramycin) 100 mg capsule; Take 1 capsule (100 mg) by mouth 2 times a day for 10 days. Take with at least 8 ounces (large glass) of water, do not lie down for 30 minutes after  -     methylPREDNISolone (Medrol Dospak) 4 mg tablets; Follow schedule on package instructions  -     albuterol (Proventil HFA) 90 mcg/actuation inhaler; Inhale 2 puffs every 4 hours if needed for wheezing or shortness of breath.  Wheeze  -     doxycycline (Vibramycin) 100 mg capsule; Take 1 capsule (100 mg) by mouth 2 times a day for 10 days. Take with at least 8 ounces (large glass) of water, do not lie down for 30 minutes after  -     methylPREDNISolone (Medrol Dospak) 4 mg tablets; Follow schedule on package instructions  -     albuterol (Proventil HFA) 90 mcg/actuation inhaler; Inhale 2 puffs every 4 hours if needed for wheezing or shortness of breath.      Return to Urgent care if symptoms return or progress  Follow up with PCP in 1-2 weeks     Patient disposition: Home    Electronically signed by KYLEIGH Lemus-WILMA  4:50  PM

## 2025-01-17 ENCOUNTER — OFFICE VISIT (OUTPATIENT)
Dept: OTOLARYNGOLOGY | Facility: CLINIC | Age: 50
End: 2025-01-17
Payer: COMMERCIAL

## 2025-01-17 DIAGNOSIS — H81.01 MENIERE'S DISEASE OF RIGHT EAR: Primary | ICD-10-CM

## 2025-01-17 PROCEDURE — 99213 OFFICE O/P EST LOW 20 MIN: CPT | Performed by: OTOLARYNGOLOGY

## 2025-01-17 NOTE — PROGRESS NOTES
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    Meniere's disease is a chronic inner ear disorder that causes symptoms such as:  Vertigo: Sudden, severe dizziness or a spinning sensation. Could be episodic. In your case you had multiple episodes.  Hearing loss: Typically fluctuating, especially in the low frequencies ( you have low frequency hearing loss in your right ear), which can worsen over time.  Tinnitus: Ringing or buzzing in the ear.  Aural fullness: A feeling of pressure or fullness in the ear.    Causes:  The exact cause is unknown, but it's believed to be related to an abnormal buildup of fluid (endolymph) in the inner ear, affecting balance and hearing. Factors such as genetics, viral infections, autoimmune conditions, or abnormalities in blood circulation may contribute.    Treatment:  Acute attacks: Anti-vertigo medications (e.g., meclizine), anti-nausea drugs, or intravenous fluids during severe episodes.    Long-term management: Salt caffeine, alcohol restriction, diuretics if needed, and vestibular rehabilitation therapy, if needed. In some cases, corticosteroids or surgery may be required.    Meniere's disease often progresses slowly, and the severity of symptoms can vary over time.     Plan:  1- Please follow low-salt and caffeine diet.  No alcohol.    2- Try to avoid stress as much as possible.    3- Intratympanic steroid injection if the attacks become frequent, he had two attacks so far, second one was very mild.  4- follow up in 2 months, if ear fullness continues then consider diuretic  _________________________________________________________________    Moe Linares is a 49 y.o. male presenting with: \"Was in the ER in September was treated for vertigo\". He was in ER on 08.28.2024 for dizziness, which lasted for hours. CT head was normal. He had similar attacks in the past. Could be at least once a year. He vomited " couple of times.     Tinnitus (+), off and on  He has ear fullness (+) both,   Decreased or fluctuating hearing (-)  History of ear tubes in childhood.    On examination, there is a small, round protuberance at left ear canal close to the entrance of ear canal around 9 o'clock. TMs look intact bilaterally. Nasal septum deviated to left. No visible postnasal discharge. Mild right postauricular tenderness. No palpable neck mass.  [place]-Hallpike maneuver has shown mild dizziness at left without nystagmus.  Left Epley maneuver was performed.    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    Plan  1-audio vestibular tests  2-follow-up after the tests      Past Medical History  He has no past medical history on file.    Surgical History  He has no past surgical history on file.     Social History  He reports that he has never smoked. He has never used smokeless tobacco. He reports that he does not drink alcohol and does not use drugs.    Family History  No family history on file.     Allergies  Patient has no known allergies.    Review of Systems   Feeling tired  Eyes itch  Ear pain  Vertigo  Dizziness upon standing  Cough  Coughing up sputum  Heartburn  Nausea  Vomiting     Physical Exam    General appearance: Healthy-appearing, well-nourished, well groomed, in no acute distress.     Head and Face: Atraumatic with no masses, lesions, or scarring.      Salivary glands: No tenderness of the parotid glands or parotid masses.     No tenderness of the submandibular glands or submandibular masses.      Facial strength: Normal strength and symmetry, no synkinesis or facial tic.     Eyes: Conjunctivas look non-hyperemic bilaterally    Ears:Small round mass in left ear canal around 9 o'clock, looks benign. Tympanic membranes look intact, no hyperemia, fluid or retraction.      Nose: Mucosa looks normal. No purulent discharge. Septum deviated to left.     Oral  Cavity/Mouth: Lips and tongue look normal.     Throat: No postnasal discharge. No tonsil hypertrophy. No hyperemia.    Neck: Symmetrical, trachea midline.     Pulmonary: Normal respiratory effort.     Lymphatic: No palpable pathologic lymph nodes at neck.     Neurological/Psychiatric Orientation to person, place, and time: Normal.     Mood and affect: Normal.      Extremities: No clubbing.     Skin: No significant skin lesions were noted at face or neck        Procedure         Last Recorded Vitals  There were no vitals taken for this visit.    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    Meniere's disease is a chronic inner ear disorder that causes symptoms such as:  Vertigo: Sudden, severe dizziness or a spinning sensation. Could be episodic. In your case you had multiple episodes.  Hearing loss: Typically fluctuating, especially in the low frequencies ( you have low frequency hearing loss in your right ear), which can worsen over time.  Tinnitus: Ringing or buzzing in the ear.  Aural fullness: A feeling of pressure or fullness in the ear.    Causes:  The exact cause is unknown, but it's believed to be related to an abnormal buildup of fluid (endolymph) in the inner ear, affecting balance and hearing. Factors such as genetics, viral infections, autoimmune conditions, or abnormalities in blood circulation may contribute.    Treatment:  Acute attacks: Anti-vertigo medications (e.g., meclizine), anti-nausea drugs, or intravenous fluids during severe episodes.    Long-term management: Salt caffeine, alcohol restriction, diuretics if needed, and vestibular rehabilitation therapy, if needed. In some cases, corticosteroids or surgery may be required.    Meniere's disease often progresses slowly, and the severity of symptoms can vary over time.     Plan:  1- Please follow low-salt and caffeine diet.  No alcohol.    2- Try to avoid  "stress as much as possible.    3- Intratympanic steroid injection if the attacks become frequent, he had two attacks so far, second one was very mild.  4- follow up in 2 months, if ear fullness continues then consider diuretic  _________________________________________________________________    10.14.2024: Moe Linares is a 49 y.o. male presenting with: \"Was in the ER in September was treated for vertigo\". He was in ER on 08.28.2024 for dizziness, which lasted for hours. CT head was normal. He had similar attacks in the past. Could be at least once a year. He vomited couple of times.     Tinnitus (+), off and on  He has ear fullness (+) both,   Decreased or fluctuating hearing (-)  History of ear tubes in childhood.    On examination, there is a small, round protuberance at left ear canal close to the entrance of ear canal around 9 o'clock. TMs look intact bilaterally. Nasal septum deviated to left. No visible postnasal discharge. Mild right postauricular tenderness. No palpable neck mass.  [place]-Hallpike maneuver has shown mild dizziness at left without nystagmus.  Left Epley maneuver was performed.    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    Plan  1-audio vestibular tests  2-follow-up after the tests    Eliezer Snow  Otolaryngology - Head & Neck Surgery  "

## 2025-02-14 ENCOUNTER — APPOINTMENT (OUTPATIENT)
Dept: OTOLARYNGOLOGY | Facility: CLINIC | Age: 50
End: 2025-02-14
Payer: COMMERCIAL

## 2025-03-14 ENCOUNTER — APPOINTMENT (OUTPATIENT)
Dept: OTOLARYNGOLOGY | Facility: CLINIC | Age: 50
End: 2025-03-14
Payer: COMMERCIAL

## 2025-03-14 DIAGNOSIS — H81.01 ENDOLYMPHATIC HYDROPS OF RIGHT EAR: Primary | ICD-10-CM

## 2025-03-14 DIAGNOSIS — H93.8X2 EAR FULLNESS, LEFT: ICD-10-CM

## 2025-03-14 PROCEDURE — 99213 OFFICE O/P EST LOW 20 MIN: CPT | Performed by: OTOLARYNGOLOGY

## 2025-03-14 PROCEDURE — 1036F TOBACCO NON-USER: CPT | Performed by: OTOLARYNGOLOGY

## 2025-03-14 RX ORDER — ACETAZOLAMIDE 125 MG/1
125 TABLET ORAL DAILY
Qty: 30 TABLET | Refills: 1 | Status: SHIPPED | OUTPATIENT
Start: 2025-03-14 | End: 2025-03-14

## 2025-03-14 RX ORDER — ACETAZOLAMIDE 125 MG/1
125 TABLET ORAL DAILY
Qty: 30 TABLET | Refills: 1 | Status: SHIPPED | OUTPATIENT
Start: 2025-03-14 | End: 2025-05-13

## 2025-03-14 NOTE — PROGRESS NOTES
History Of Present Illness    03.14.2025. He still has little bit of dizziness every once in a while, but not severe. Right ear feels full, stuffy despite following low salt an caffeine diet.    TMs look intact.   [place] Hallpike maneuver has shown mild dizziness at right without nystagmus.    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    Meniere's disease is a chronic inner ear disorder that causes symptoms such as:  Vertigo: Sudden, severe dizziness or a spinning sensation. Could be episodic. In your case you had multiple episodes.  Hearing loss: Typically fluctuating, especially in the low frequencies ( you have low frequency hearing loss in your right ear), which can worsen over time.  Tinnitus: Ringing or buzzing in the ear.  Aural fullness: A feeling of pressure or fullness in the ear.    Causes:  The exact cause is unknown, but it's believed to be related to an abnormal buildup of fluid (endolymph) in the inner ear, affecting balance and hearing. Factors such as genetics, viral infections, autoimmune conditions, or abnormalities in blood circulation may contribute.    Treatment:  Acute attacks: Anti-vertigo medications (e.g., meclizine), anti-nausea drugs, or intravenous fluids during severe episodes.    Long-term management: Salt caffeine, alcohol restriction, diuretics if needed, and vestibular rehabilitation therapy, if needed. In some cases, corticosteroids or surgery may be required.    Meniere's disease often progresses slowly, and the severity of symptoms can vary over time.     Plan:  1- Please follow low-salt and caffeine diet.  No alcohol.    2- Try to avoid stress as much as possible.    3- Intratympanic steroid injection if the attacks become frequent, he had two attacks so far, second one was very mild.  4- follow up in  "2 months, if ear fullness continues then consider diuretic  _________________________________________________________________    10.14.2024: Moe Linares is a 50 y.o. male presenting with: \"Was in the ER in September was treated for vertigo\". He was in ER on 08.28.2024 for dizziness, which lasted for hours. CT head was normal. He had similar attacks in the past. Could be at least once a year. He vomited couple of times.     Tinnitus (+), off and on  He has ear fullness (+) both,   Decreased or fluctuating hearing (-)  History of ear tubes in childhood.    On examination, there is a small, round protuberance at left ear canal close to the entrance of ear canal around 9 o'clock. TMs look intact bilaterally. Nasal septum deviated to left. No visible postnasal discharge. Mild right postauricular tenderness. No palpable neck mass.  [place]-Hallpike maneuver has shown mild dizziness at left without nystagmus.  Left Epley maneuver was performed.    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    Plan  1-audio vestibular tests  2-follow-up after the tests      Past Medical History  He has no past medical history on file.    Surgical History  He has no past surgical history on file.     Social History  He reports that he has never smoked. He has never used smokeless tobacco. He reports that he does not drink alcohol and does not use drugs.    Family History  No family history on file.     Allergies  Patient has no known allergies.    Review of Systems (Initial ROS)  Feeling tired  Eyes itch  Ear pain  Vertigo  Dizziness upon standing  Cough  Coughing up sputum  Heartburn  Nausea  Vomiting     Physical Exam (initial exam)    General appearance: Healthy-appearing, well-nourished, well groomed, in no acute distress.     Head and Face: Atraumatic with no masses, lesions, or scarring.      Salivary glands: No tenderness of the parotid glands or parotid masses. "     No tenderness of the submandibular glands or submandibular masses.      Facial strength: Normal strength and symmetry, no synkinesis or facial tic.     Eyes: Conjunctivas look non-hyperemic bilaterally    Ears:Small round mass in left ear canal around 9 o'clock, looks benign. Tympanic membranes look intact, no hyperemia, fluid or retraction.      Nose: Mucosa looks normal. No purulent discharge. Septum deviated to left.     Oral Cavity/Mouth: Lips and tongue look normal.     Throat: No postnasal discharge. No tonsil hypertrophy. No hyperemia.    Neck: Symmetrical, trachea midline.     Pulmonary: Normal respiratory effort.     Lymphatic: No palpable pathologic lymph nodes at neck.     Neurological/Psychiatric Orientation to person, place, and time: Normal.     Mood and affect: Normal.      Extremities: No clubbing.     Skin: No significant skin lesions were noted at face or neck        Procedure         Last Recorded Vitals  There were no vitals taken for this visit.    Relevant Results    Assessment and Plan:    03.14.2025. He still has little bit of dizziness every once in a while, but not severe. Right ear feels full, stuffy despite following low salt an caffeine diet.    TMs look intact.   [place] Hallpike maneuver has shown mild dizziness at right without nystagmus.    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    Meniere's disease is a chronic inner ear disorder that causes symptoms such as:  Vertigo: Sudden, severe dizziness or a spinning sensation. Could be episodic. In your case you had multiple episodes.  Hearing loss: Typically fluctuating, especially in the low frequencies ( you have low frequency hearing loss in your right ear), which can worsen over time.  Tinnitus: Ringing or buzzing in the ear.  Aural fullness: A feeling  "of pressure or fullness in the ear.    Causes:  The exact cause is unknown, but it's believed to be related to an abnormal buildup of fluid (endolymph) in the inner ear, affecting balance and hearing. Factors such as genetics, viral infections, autoimmune conditions, or abnormalities in blood circulation may contribute.    Treatment:  Acute attacks: Anti-vertigo medications (e.g., meclizine), anti-nausea drugs, or intravenous fluids during severe episodes.    Long-term management: Salt caffeine, alcohol restriction, diuretics if needed, and vestibular rehabilitation therapy, if needed. In some cases, corticosteroids or surgery may be required.    Meniere's disease often progresses slowly, and the severity of symptoms can vary over time.     Plan:  1- Please follow low-salt and caffeine diet.  No alcohol.    2- Try to avoid stress as much as possible.    3- Intratympanic steroid injection if the attacks become frequent, he had two attacks so far, second one was very mild.  4- follow up in 2 months, if ear fullness continues then consider diuretic  _________________________________________________________________    10.14.2024: Moe Linares is a 49 y.o. male presenting with: \"Was in the ER in September was treated for vertigo\". He was in ER on 08.28.2024 for dizziness, which lasted for hours. CT head was normal. He had similar attacks in the past. Could be at least once a year. He vomited couple of times.     Tinnitus (+), off and on  He has ear fullness (+) both,   Decreased or fluctuating hearing (-)  History of ear tubes in childhood.    On examination, there is a small, round protuberance at left ear canal close to the entrance of ear canal around 9 o'clock. TMs look intact bilaterally. Nasal septum deviated to left. No visible postnasal discharge. Mild right postauricular tenderness. No palpable neck mass.  [place]-Hallpike maneuver has shown mild dizziness at left without nystagmus.  Left Epley maneuver was " performed.    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    Plan  1-audio vestibular tests  2-follow-up after the tests    Eliezer Snow  Otolaryngology - Head & Neck Surgery

## 2025-04-14 ENCOUNTER — APPOINTMENT (OUTPATIENT)
Dept: OTOLARYNGOLOGY | Facility: CLINIC | Age: 50
End: 2025-04-14
Payer: COMMERCIAL

## 2025-04-23 ENCOUNTER — OFFICE VISIT (OUTPATIENT)
Dept: URGENT CARE | Age: 50
End: 2025-04-23
Payer: COMMERCIAL

## 2025-04-23 ENCOUNTER — ANCILLARY PROCEDURE (OUTPATIENT)
Dept: URGENT CARE | Age: 50
End: 2025-04-23
Payer: COMMERCIAL

## 2025-04-23 VITALS
HEART RATE: 70 BPM | OXYGEN SATURATION: 97 % | BODY MASS INDEX: 31.83 KG/M2 | HEIGHT: 72 IN | WEIGHT: 235 LBS | RESPIRATION RATE: 14 BRPM | TEMPERATURE: 98 F | DIASTOLIC BLOOD PRESSURE: 87 MMHG | SYSTOLIC BLOOD PRESSURE: 130 MMHG

## 2025-04-23 DIAGNOSIS — M79.645 PAIN IN LEFT FINGER(S): ICD-10-CM

## 2025-04-23 DIAGNOSIS — L03.012 CELLULITIS OF LEFT MIDDLE FINGER: ICD-10-CM

## 2025-04-23 DIAGNOSIS — S62.633B OPEN DISPLACED FRACTURE OF DISTAL PHALANX OF LEFT MIDDLE FINGER, INITIAL ENCOUNTER: Primary | ICD-10-CM

## 2025-04-23 PROCEDURE — 99213 OFFICE O/P EST LOW 20 MIN: CPT

## 2025-04-23 PROCEDURE — 3008F BODY MASS INDEX DOCD: CPT

## 2025-04-23 PROCEDURE — 73140 X-RAY EXAM OF FINGER(S): CPT | Mod: LEFT SIDE

## 2025-04-23 PROCEDURE — 90715 TDAP VACCINE 7 YRS/> IM: CPT

## 2025-04-23 PROCEDURE — 90471 IMMUNIZATION ADMIN: CPT

## 2025-04-23 PROCEDURE — 99070 SPECIAL SUPPLIES PHYS/QHP: CPT

## 2025-04-23 RX ORDER — DOXYCYCLINE 100 MG/1
100 CAPSULE ORAL 2 TIMES DAILY
Qty: 20 CAPSULE | Refills: 0 | Status: SHIPPED | OUTPATIENT
Start: 2025-04-23 | End: 2025-05-03

## 2025-04-23 NOTE — PROGRESS NOTES
"Subjective   Patient ID: Moe Linares is a 50 y.o. male. They present today with a chief complaint of Injury (2 weeks smashed with hammer Left middle finger).    History of Present Illness  50-year-old male presents urgent care for left middle finger injury states sustained 2 weeks ago when he smashed his distal middle finger with a hammer.  States some redness started yesterday and is also draining some clear fluid and has an odor.  Denies any pain or warmth to the finger.  Denies any fevers or chills, nausea vomiting or sweats focal deficits.  Denies history of diabetes or significant medical history.  Patient is neurovascularly intact.  States he is not up-to-date on tetanus.  Denies any known drug allergies.  Denies any other injury or concern at this time.  Radiologist impression of the left middle finger states \"1.  Slightly distracted, comminuted fracture of the distal phalangeal  tuft of the 3rd right digit, with mild surrounding soft tissue  swelling.  2. A 5-6 mm concave lucency within the dorsal soft tissues near the  nailbed is incompletely characterized on current exam and correlation  with physical exam is recommended.\".  Tetanus was updated in the urgent care today.  Placed on doxycycline.  Dressed with bacitracin and Band-Aid.  Placed Ortho referral and gave walk-in Ortho injury clinic information if unable to be seen by orthopedics tomorrow.  If unable to be seen by orthopedist at all tomorrow he is instructed to go to the ER immediately tomorrow.  I discussed patient with supervising physician Dr. Verde who agrees with plan.          Past Medical History  Allergies as of 04/23/2025    (No Known Allergies)       Prescriptions Prior to Admission[1]     Medical History[2]    Surgical History[3]     reports that he has never smoked. He uses smokeless tobacco. He reports that he does not drink alcohol and does not use drugs.    Review of Systems  Review of Systems   All other systems reviewed and are " negative.                                 Objective    Vitals:    04/23/25 1706   BP: 130/87   BP Location: Left arm   Patient Position: Sitting   BP Cuff Size: Adult   Pulse: 70   Resp: 14   Temp: 36.7 °C (98 °F)   TempSrc: Oral   SpO2: 97%   Weight: 107 kg (235 lb)   Height: 1.829 m (6')     No LMP for male patient.    Physical Exam  Vitals reviewed.   Constitutional:       General: He is not in acute distress.     Appearance: Normal appearance. He is not ill-appearing, toxic-appearing or diaphoretic.   HENT:      Head: Normocephalic and atraumatic.      Nose: Nose normal.   Cardiovascular:      Rate and Rhythm: Normal rate and regular rhythm.   Pulmonary:      Effort: Pulmonary effort is normal. No respiratory distress.   Musculoskeletal:      Cervical back: Normal range of motion and neck supple.   Skin:     General: Skin is warm and dry.   Neurological:      General: No focal deficit present.      Mental Status: He is alert and oriented to person, place, and time.   Psychiatric:         Mood and Affect: Mood normal.         Behavior: Behavior normal.         Procedures    Point of Care Test & Imaging Results from this visit  No results found for this visit on 04/23/25.   Imaging  No results found.    Cardiology, Vascular, and Other Imaging  No other imaging results found for the past 2 days      Diagnostic study results (if any) were reviewed by Leonie Mary PA-C.    Assessment/Plan   Allergies, medications, history, and pertinent labs/EKGs/Imaging reviewed by Leonie Mary PA-C.     Medical Decision Making  50-year-old male presents urgent care for left middle finger injury states sustained 2 weeks ago when he smashed his distal middle finger with a hammer.  States some redness started yesterday and is also draining some clear fluid and has an odor.  Denies any pain or warmth to the finger.  Denies any fevers or chills, nausea vomiting or sweats focal deficits.  Denies history of diabetes or  "significant medical history.  Patient is neurovascularly intact.  States he is not up-to-date on tetanus.  Denies any known drug allergies.  Denies any other injury or concern at this time.  Radiologist impression of the left middle finger states \"1.  Slightly distracted, comminuted fracture of the distal phalangeal  tuft of the 3rd right digit, with mild surrounding soft tissue  swelling.  2. A 5-6 mm concave lucency within the dorsal soft tissues near the  nailbed is incompletely characterized on current exam and correlation  with physical exam is recommended.\".  Tetanus was updated in the urgent care today.  Placed on doxycycline.  Dressed with bacitracin and Band-Aid.  Placed Ortho referral and gave walk-in Ortho injury clinic information if unable to be seen by orthopedics tomorrow.  If unable to be seen by orthopedist at all tomorrow he is instructed to go to the ER immediately tomorrow.  I discussed patient with supervising physician Dr. Verde who agrees with plan.    Orders and Diagnoses  There are no diagnoses linked to this encounter.    Medical Admin Record      Patient disposition: Home    Electronically signed by Leonie Mary PA-C  5:08 PM           [1] (Not in a hospital admission)  [2] No past medical history on file.  [3] No past surgical history on file.    "

## 2025-04-23 NOTE — PATIENT INSTRUCTIONS
Take medications as prescribed.  Maintain adequate hydration and nutrition.  Call referral number today to schedule appointment for orthopedist tomorrow.  If unable to schedule appointment for tomorrow please use the Ortho walk-in injury clinic information to go see orthopedist first thing in tomorrow morning.  If unable to see orthopedist tomorrow please go directly to the emergency department tomorrow.  If you develop fevers, nausea vomiting or sweats, systemic symptoms, purulent discharge or any other concerning symptoms please go to the ER immediately.

## 2025-04-24 ENCOUNTER — OFFICE VISIT (OUTPATIENT)
Dept: ORTHOPEDIC SURGERY | Facility: CLINIC | Age: 50
End: 2025-04-24
Payer: COMMERCIAL

## 2025-04-24 VITALS — BODY MASS INDEX: 31.87 KG/M2 | WEIGHT: 235 LBS

## 2025-04-24 DIAGNOSIS — S62.639B OPEN DISPLACED FRACTURE OF DISTAL PHALANX OF FINGER OF LEFT HAND: Primary | ICD-10-CM

## 2025-04-24 PROCEDURE — 99203 OFFICE O/P NEW LOW 30 MIN: CPT

## 2025-04-24 NOTE — PROGRESS NOTES
HPI:  Moe is a 50 year old male presenting for injury to the right middle finger. The patient hit the finger with a hammer two weeks ago. He was seen at an urgent care yesterday, where imaging was obtained and he was given doxycycline. X-Rays showed a fracture of the distal middle phalanx. The patient states he went to urgent care because of a new onset of foul smell from the wound. There is localized swelling and redness, which are resolving. There is not current discharge, but the patient says there was recently clear discharge. He is neurovascularly intact with full range of motion.     Past Medical History:   Medical History[1]    Past Surgical History:   Surgical History[2]    Medications:   Medications Ordered Prior to Encounter[3]    Physical Exam:   Constitutional: Well nourished, well developed male not in acute distress   Psychiatric: Normal mood, appropriate affect  Eyes: Sclera anicteric   HENT: Normocephalic atraumatic  CV: Regular rate and rhythm  Respiratory: Nonlabored breathing  Integumentary: No rash  Neurologic: A&O x3, moves all extremities     Hand/Wrist Musculoskeletal Exam    Inspection    Right      Erythema: mild      Ecchymosis: none      Edema: mild    Inspection additional comments: Nail is  at the nailbed. Small amount of clear discharge when pushing on nail, odor from finger.    Palpation    Right      Middle tenderness to palpation: distal phalanx    Range of Motion    Right Hand      Right hand range of motion is normal.         Strength    Right Hand      Right hand strength is normal.         Neurovascular    Right       Right neurovascular exam is normal.      Imaging/Lab:   XR fingers left 2+ views  Result Date: 4/23/2025  Interpreted By:  Adriana Aldridge, STUDY: XR FINGERS LEFT 2+ VIEWS; ;  4/23/2025 5:15 pm   INDICATION: Signs/Symptoms:smashed left middle finger 2 weeks ago.   ,M79.645 Pain in left finger(s)   COMPARISON: None.   ACCESSION NUMBER(S):  [id number]   ORDERING CLINICIAN: CASSIUS STONER   FINDINGS: Three views of the 3rd right digit are provided for interpretation.   There is a slightly distracted, comminuted fracture of the distal phalangeal tuft of the 3rd right digit with mild surrounding soft tissue swelling. A 5-6 mm concave lucency within the soft tissues near the nail bed may be posttraumatic but is incompletely characterized on current exam.   No radiopaque foreign body is present. No other acute osseous abnormalities of the visualized.       1.  Slightly distracted, comminuted fracture of the distal phalangeal tuft of the 3rd right digit, with mild surrounding soft tissue swelling. 2. A 5-6 mm concave lucency within the dorsal soft tissues near the nailbed is incompletely characterized on current exam and correlation with physical exam is recommended.       MACRO: None   Signed by: Adriana Aldridge 4/23/2025 5:36 PM Dictation workstation:   [id number]    Assessment:   Open fracture of right middle distal phalanx with damage to nail     Plan:   History, physical exam, and imaging were discussed with the patient. Continue doxycycline prescription and keep wound bandaged. You can periodically soak the area in water with antibacterial soap. The nail may fall off on its own.   Follow up: In 1 week after doxycycline prescription is finished    RONIT Ruiz  4/24/25          [1] History reviewed. No pertinent past medical history.  [2] History reviewed. No pertinent surgical history.  [3]   Current Outpatient Medications on File Prior to Visit   Medication Sig Dispense Refill    acetaZOLAMIDE (Diamox) 125 mg tablet Take 1 tablet (125 mg) by mouth once daily. 30 tablet 1    albuterol (Proventil HFA) 90 mcg/actuation inhaler Inhale 2 puffs every 4 hours if needed for wheezing or shortness of breath. 6.7 g 0    doxycycline (Vibramycin) 100 mg capsule Take 1 capsule (100 mg) by mouth 2 times a day for 10 days. Take with at least 8  ounces (large glass) of water, do not lie down for 30 minutes after 20 capsule 0     No current facility-administered medications on file prior to visit.

## 2025-05-01 ENCOUNTER — APPOINTMENT (OUTPATIENT)
Dept: ORTHOPEDIC SURGERY | Facility: CLINIC | Age: 50
End: 2025-05-01
Payer: COMMERCIAL

## 2025-05-01 VITALS — WEIGHT: 235 LBS | BODY MASS INDEX: 31.87 KG/M2

## 2025-05-01 DIAGNOSIS — S69.91XD HAND INJURY, RIGHT, SUBSEQUENT ENCOUNTER: ICD-10-CM

## 2025-05-01 DIAGNOSIS — S62.639B OPEN DISPLACED FRACTURE OF DISTAL PHALANX OF FINGER OF LEFT HAND: Primary | ICD-10-CM

## 2025-05-01 PROCEDURE — 99212 OFFICE O/P EST SF 10 MIN: CPT

## 2025-05-01 ASSESSMENT — PAIN - FUNCTIONAL ASSESSMENT: PAIN_FUNCTIONAL_ASSESSMENT: NO/DENIES PAIN

## 2025-05-01 NOTE — PROGRESS NOTES
HOWARD Linares is a 50 y.o. male in office today for follow up of side: left middle finger fracture.  he is doing a lot better than last week.  He has been taking the antibiotics as prescribed, no longer getting any drainage from under the nail, pain and redness have improved, no odor.  Nail still hanging on. He has also been dressing the finger as instructed and soaking in clean soapy water. No new issues or concerns.      Physical Exam  Constitutional: well developed, well nourished male in no acute distress  Psychiatric: normal mood, appropriate affect  Eyes: sclera anicteric  HENT: normocephalic/atraumatic  CV: regular rate and rhythm   Respiratory: non labored breathing  Integumentary: no rash  Neurological: moves all extremities    Left Hand Exam     Tenderness   Left hand tenderness location: no tenderness.     Range of Motion   The patient has normal left wrist ROM.    Other   Erythema: absent  Scars: absent  Sensation: normal    Comments:  No edema, erythema, drainage from distal middle finger or from around or under nail.            Imaging/Lab:  No new imaging today    Assessment  Assessment: open distal phalanx fracture of left middle finger    Plan  Plan:  History, physical exam, and imaging were reviewed with patient. Discussed again that the nail may need to removed and the nailbed repaired, but improving at this time.  Continue to take antibiotics until completed, soaking finger in clean water with antibacteria soap.  If the finger has any drainage, erythema, increased tenderness, should call and we will schedule for wash out and repair.  Follow Up: 2 weeks with new x-ray    All questions were answered for the patient prior to end of exam and patient addressed their understanding.    Gela Jordan PA-C  05/01/25

## 2025-05-15 ENCOUNTER — APPOINTMENT (OUTPATIENT)
Dept: ORTHOPEDIC SURGERY | Facility: CLINIC | Age: 50
End: 2025-05-15
Payer: COMMERCIAL